# Patient Record
Sex: MALE | Race: WHITE | NOT HISPANIC OR LATINO
[De-identification: names, ages, dates, MRNs, and addresses within clinical notes are randomized per-mention and may not be internally consistent; named-entity substitution may affect disease eponyms.]

---

## 2017-11-17 ENCOUNTER — APPOINTMENT (OUTPATIENT)
Dept: HUMAN REPRODUCTION | Facility: CLINIC | Age: 37
End: 2017-11-17
Payer: COMMERCIAL

## 2017-11-17 PROCEDURE — 89322 SEMEN ANAL STRICT CRITERIA: CPT

## 2019-02-14 ENCOUNTER — EMERGENCY (EMERGENCY)
Facility: HOSPITAL | Age: 39
LOS: 1 days | End: 2019-02-14

## 2019-02-14 VITALS
TEMPERATURE: 99 F | DIASTOLIC BLOOD PRESSURE: 75 MMHG | WEIGHT: 190.92 LBS | HEIGHT: 73 IN | RESPIRATION RATE: 18 BRPM | HEART RATE: 78 BPM | OXYGEN SATURATION: 95 % | SYSTOLIC BLOOD PRESSURE: 117 MMHG

## 2019-03-13 ENCOUNTER — OUTPATIENT (OUTPATIENT)
Dept: OUTPATIENT SERVICES | Facility: HOSPITAL | Age: 39
LOS: 1 days | End: 2019-03-13

## 2019-03-13 VITALS
TEMPERATURE: 98 F | HEIGHT: 72 IN | DIASTOLIC BLOOD PRESSURE: 70 MMHG | RESPIRATION RATE: 16 BRPM | HEART RATE: 68 BPM | WEIGHT: 197.98 LBS | SYSTOLIC BLOOD PRESSURE: 100 MMHG

## 2019-03-13 DIAGNOSIS — Z98.52 VASECTOMY STATUS: Chronic | ICD-10-CM

## 2019-03-13 DIAGNOSIS — D21.0 BENIGN NEOPLASM OF CONNECTIVE AND OTHER SOFT TISSUE OF HEAD, FACE AND NECK: ICD-10-CM

## 2019-03-13 DIAGNOSIS — Z98.890 OTHER SPECIFIED POSTPROCEDURAL STATES: Chronic | ICD-10-CM

## 2019-03-13 NOTE — H&P PST ADULT - NSICDXPROBLEM_GEN_ALL_CORE_FT
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   Preop, famotidine and chlorhexidine instructions provided and questions addressed.

## 2019-03-13 NOTE — H&P PST ADULT - HISTORY OF PRESENT ILLNESS
38 yr old male with no significant medical hx presents for preop evaluation with dx of benign neoplasm of connective of soft tissue of neck.  Pt is now scheduled for Excision Posterior Neck Mass on 03/27/19.

## 2019-03-13 NOTE — H&P PST ADULT - NS MD HP PULSE RADIAL
PROBLEM: Left glenohumeral osteoarthritis      SUBJECTIVE: Pt here for repeat US-guided corticosteroid injection for left shoulder osteoarthritis.      OBJECTIVE: Shoulder x-rays reviewed.      ASSESSMENT: Left glenohumeral osteoarthritis      PLAN: US-guided corticosteroid injection Left shoulder      PROCEDURE: The indications, risks, expected benefits were discussed.  Formal consent was obtained. The humeral head, glenoid, hyperechoic triangle indicating the posterior labrum were identified by ultrasound.  Initially, 3 mL ropivicaine  was injected with US guidance along the injection track for anesthesia.  Using sterile technique, a syringe with a 80 mm , 22 gauge needle containing 1 mL Kenalog 40 mg/mL and 3 mL ropivicaine  were injected using an US guided posterior approach into the left glenohumeral joint.  US used to guide needle placement and verify proper needle position.  Images indicating proper needle placement were recorded.  Upon completion of the injection, the wound was dressed with a bandaid. Follow up with me in 4 weeks.   left normal/right normal

## 2019-03-13 NOTE — H&P PST ADULT - RS GEN PE MLT RESP DETAILS PC
no wheezes/no rhonchi/clear to auscultation bilaterally/no rales/respirations non-labored/breath sounds equal

## 2019-03-26 ENCOUNTER — TRANSCRIPTION ENCOUNTER (OUTPATIENT)
Age: 39
End: 2019-03-26

## 2019-03-26 NOTE — ASU DISCHARGE PLAN (ADULT/PEDIATRIC) - CARE PROVIDER_API CALL
Patito Prakash (MD)  ColonRectal Surgery; Surgery  [address], [address]  [place] [address]  Phone: [phone]  Fax: [phone]  Follow Up Time: 2 weeks

## 2019-03-26 NOTE — ASU DISCHARGE PLAN (ADULT/PEDIATRIC) - PAIN MANAGEMENT
Take over the counter pain medication Prescription called to pharmacy/Take over the counter pain medication

## 2019-03-26 NOTE — ASU DISCHARGE PLAN (ADULT/PEDIATRIC) - CALL YOUR DOCTOR IF YOU HAVE ANY OF THE FOLLOWING:
Wound/Surgical Site with redness, or foul smelling discharge or pus/Pain not relieved by Medications/Swelling that gets worse/Fever greater than (need to indicate Fahrenheit or Celsius)/Bleeding that does not stop

## 2019-03-26 NOTE — ASU DISCHARGE PLAN (ADULT/PEDIATRIC) - ASU DC SPECIAL INSTRUCTIONSFT
- Leave steri strips in place  - Ice for swelling  - Advil or Tylenol for pain  - Follow up with Dr. Prakash in the office in 2 weeks - Leave steri strips in place  - Ice for swelling  - Advil or Tylenol for pain, Percocet for breakthrough pain  - Follow up with Dr. Prakash in the office in 2 weeks

## 2019-03-27 ENCOUNTER — OUTPATIENT (OUTPATIENT)
Dept: OUTPATIENT SERVICES | Facility: HOSPITAL | Age: 39
LOS: 1 days | Discharge: HOME | End: 2019-03-27
Payer: COMMERCIAL

## 2019-03-27 ENCOUNTER — RESULT REVIEW (OUTPATIENT)
Age: 39
End: 2019-03-27

## 2019-03-27 VITALS
RESPIRATION RATE: 18 BRPM | SYSTOLIC BLOOD PRESSURE: 127 MMHG | HEART RATE: 58 BPM | TEMPERATURE: 98 F | OXYGEN SATURATION: 98 % | DIASTOLIC BLOOD PRESSURE: 79 MMHG | HEIGHT: 72 IN | WEIGHT: 197.98 LBS

## 2019-03-27 VITALS
RESPIRATION RATE: 14 BRPM | OXYGEN SATURATION: 100 % | HEART RATE: 50 BPM | TEMPERATURE: 97 F | DIASTOLIC BLOOD PRESSURE: 68 MMHG | SYSTOLIC BLOOD PRESSURE: 103 MMHG

## 2019-03-27 DIAGNOSIS — Z98.52 VASECTOMY STATUS: Chronic | ICD-10-CM

## 2019-03-27 DIAGNOSIS — D21.0 BENIGN NEOPLASM OF CONNECTIVE AND OTHER SOFT TISSUE OF HEAD, FACE AND NECK: ICD-10-CM

## 2019-03-27 DIAGNOSIS — Z98.890 OTHER SPECIFIED POSTPROCEDURAL STATES: Chronic | ICD-10-CM

## 2019-03-27 PROCEDURE — 88304 TISSUE EXAM BY PATHOLOGIST: CPT | Mod: 26

## 2019-04-04 LAB — SURGICAL PATHOLOGY STUDY: SIGNIFICANT CHANGE UP

## 2021-11-06 NOTE — H&P PST ADULT - NSICDXPASTMEDICALHX_GEN_ALL_CORE_FT
This patient has been assessed with a concern for Malnutrition and has been determined to have a diagnosis/diagnoses of Severe protein-calorie malnutrition and Underweight (BMI < 19).    This patient is being managed with:   Diet NPO-  Tube Feeding Modality: Nasogastric  Jevity 1.5 Bg  Total Volume for 24 Hours (mL): 720  Continuous  Starting Tube Feed Rate {mL per Hour}: 10  Increase Tube Feed Rate by (mL): 5     Every 4 hours  Until Goal Tube Feed Rate (mL per Hour): 30  Tube Feed Duration (in Hours): 24  Tube Feed Start Time: 17:00  Entered: Nov 2 2021 12:08PM     PAST MEDICAL HISTORY:  Pilonidal cyst

## 2022-10-31 PROBLEM — L05.91 PILONIDAL CYST WITHOUT ABSCESS: Chronic | Status: ACTIVE | Noted: 2019-03-13

## 2022-11-08 ENCOUNTER — OUTPATIENT (OUTPATIENT)
Dept: OUTPATIENT SERVICES | Facility: HOSPITAL | Age: 42
LOS: 1 days | End: 2022-11-08
Payer: COMMERCIAL

## 2022-11-08 VITALS
HEIGHT: 73 IN | DIASTOLIC BLOOD PRESSURE: 85 MMHG | HEART RATE: 60 BPM | SYSTOLIC BLOOD PRESSURE: 128 MMHG | TEMPERATURE: 98 F | RESPIRATION RATE: 16 BRPM | OXYGEN SATURATION: 99 % | WEIGHT: 204.59 LBS

## 2022-11-08 DIAGNOSIS — D21.12 BENIGN NEOPLASM OF CONNECTIVE AND OTHER SOFT TISSUE OF LEFT UPPER LIMB, INCLUDING SHOULDER: ICD-10-CM

## 2022-11-08 DIAGNOSIS — Z01.818 ENCOUNTER FOR OTHER PREPROCEDURAL EXAMINATION: ICD-10-CM

## 2022-11-08 DIAGNOSIS — Z98.52 VASECTOMY STATUS: Chronic | ICD-10-CM

## 2022-11-08 DIAGNOSIS — Z98.890 OTHER SPECIFIED POSTPROCEDURAL STATES: Chronic | ICD-10-CM

## 2022-11-08 PROCEDURE — G0463: CPT

## 2022-11-08 NOTE — H&P PST ADULT - PROBLEM SELECTOR PLAN 1
Excision of LEFT Posterior Mass on 11/17/2022  NPO as per Anesthesia- no meds on AM of surgery  Instructions reviewed and questions addressed  Pt states that it would be time prohibitive to come to [facility] for Covid19 screening and plans to go to a [facility] lab in his area. Screening to be done on 11/14/2022.

## 2022-11-08 NOTE — H&P PST ADULT - NSICDXPASTSURGICALHX_GEN_ALL_CORE_FT
PAST SURGICAL HISTORY:  H/O: vasectomy     S/P surgical removal of pilonidal cyst      PAST SURGICAL HISTORY:  H/O: vasectomy     S/P excision of lipoma right base of neck/upper back area    S/P surgical removal of pilonidal cyst

## 2022-11-08 NOTE — H&P PST ADULT - PATIENT'S PREFERRED PRONOUN
Upon entering abdomen met with dilated small bowel. Reduced internal hernia. Sutured mesenteric defect. Bilateral tap block Him/He

## 2022-11-08 NOTE — H&P PST ADULT - HISTORY OF PRESENT ILLNESS
43yo male patient who states that his wife notice a growth on his back approx 10 days ago. He denies any pain in the area. He saw Dr. Prakash and surgery was recommended. He presents today for PSTs.

## 2022-11-08 NOTE — H&P PST ADULT - NSICDXPASTMEDICALHX_GEN_ALL_CORE_FT
PAST MEDICAL HISTORY:  Attention deficit disorder (ADD) in adult     Pilonidal cyst      PAST MEDICAL HISTORY:  Attention deficit disorder (ADD) in adult     Mass of soft tissue left upper back area    Pilonidal cyst

## 2022-11-14 LAB — SARS-COV-2 N GENE NPH QL NAA+PROBE: NOT DETECTED

## 2022-11-16 ENCOUNTER — TRANSCRIPTION ENCOUNTER (OUTPATIENT)
Age: 42
End: 2022-11-16

## 2022-11-16 NOTE — ASU DISCHARGE PLAN (ADULT/PEDIATRIC) - CALL YOUR DOCTOR IF YOU HAVE ANY OF THE FOLLOWING:
Bleeding that does not stop/Fever greater than (need to indicate Fahrenheit or Celsius)/Nausea and vomiting that does not stop/Inability to tolerate liquids or foods Bleeding that does not stop/Fever greater than (need to indicate Fahrenheit or Celsius)/Wound/Surgical Site with redness, or foul smelling discharge or pus/Nausea and vomiting that does not stop/Inability to tolerate liquids or foods/Increased irritability or sluggishness

## 2022-11-16 NOTE — ASU DISCHARGE PLAN (ADULT/PEDIATRIC) - ASU DC SPECIAL INSTRUCTIONSFT
REST! Apply ice packs to incision sites 20 mins on, 20 mins off every 4 hours for first 24 hours.     You may take plain Tylenol, ibuprofen (Advil, Motrin), or Aleve if you wish. Do not take Ibuprofen or Aleve if you have been given a prescription for ketorolac (Toradol).    If having trouble having a bowel movement:   - Metamucil or Konsyl (fiber supplement, available over the counter), 1 tablespoon in 8 oz. of water or juice twice a day  - Colace (stool softener, available over the counter), 100mg three times a day    Keep white steri strips on until follow up at office. Sometimes they fall off on own and thats okay. Okay to shower in 24 hours.     Please call [facility] office ([phone]) to schedule a follow-up appointment to be seen in 10-14 days. REST! Apply ice packs to incision sites 20 mins on, 20 mins off every 4 hours for first 24 hours.     You may take plain Tylenol, ibuprofen (Advil, Motrin), or Aleve if you wish. Do not take Ibuprofen or Aleve if you have been given a prescription for ketorolac (Toradol).    If having trouble having a bowel movement:   - Metamucil or Konsyl (fiber supplement, available over the counter), 1 tablespoon in 8 oz. of water or juice twice a day  - Colace (stool softener, available over the counter), 100mg three times a day    Remove outer dressing in 2 days. Keep white steri strips on until follow up at office. Sometimes they fall off on own and thats okay. Okay to shower in 24 hours.     Please call [facility] office ([phone]) to schedule a follow-up appointment to be seen in 10-14 days.

## 2022-11-16 NOTE — ASU DISCHARGE PLAN (ADULT/PEDIATRIC) - CARE PROVIDER_API CALL
Patito Prakash (MD)  ColonRectal Surgery; Surgery  [address], [address]  [place] [address]  Phone: [phone]  Fax: [phone]  Follow Up Time:

## 2022-11-17 ENCOUNTER — RESULT REVIEW (OUTPATIENT)
Age: 42
End: 2022-11-17

## 2022-11-17 ENCOUNTER — OUTPATIENT (OUTPATIENT)
Dept: OUTPATIENT SERVICES | Facility: HOSPITAL | Age: 42
LOS: 1 days | End: 2022-11-17
Payer: COMMERCIAL

## 2022-11-17 VITALS — WEIGHT: 198.42 LBS

## 2022-11-17 VITALS
OXYGEN SATURATION: 100 % | DIASTOLIC BLOOD PRESSURE: 68 MMHG | TEMPERATURE: 97 F | SYSTOLIC BLOOD PRESSURE: 124 MMHG | HEART RATE: 70 BPM | RESPIRATION RATE: 16 BRPM

## 2022-11-17 DIAGNOSIS — D21.12 BENIGN NEOPLASM OF CONNECTIVE AND OTHER SOFT TISSUE OF LEFT UPPER LIMB, INCLUDING SHOULDER: ICD-10-CM

## 2022-11-17 DIAGNOSIS — Z98.52 VASECTOMY STATUS: Chronic | ICD-10-CM

## 2022-11-17 DIAGNOSIS — Z98.890 OTHER SPECIFIED POSTPROCEDURAL STATES: Chronic | ICD-10-CM

## 2022-11-17 PROCEDURE — 88304 TISSUE EXAM BY PATHOLOGIST: CPT

## 2022-11-17 PROCEDURE — 21931 EXC BACK LES SC 3 CM/>: CPT

## 2022-11-17 PROCEDURE — 88304 TISSUE EXAM BY PATHOLOGIST: CPT | Mod: 26

## 2022-11-17 RX ORDER — ONDANSETRON 8 MG/1
4 TABLET, FILM COATED ORAL ONCE
Refills: 0 | Status: DISCONTINUED | OUTPATIENT
Start: 2022-11-17 | End: 2022-11-18

## 2022-11-17 RX ORDER — CEFAZOLIN SODIUM 1 G
2000 VIAL (EA) INJECTION ONCE
Refills: 0 | Status: COMPLETED | OUTPATIENT
Start: 2022-11-17 | End: 2022-11-17

## 2022-11-17 RX ORDER — HYDROMORPHONE HYDROCHLORIDE 2 MG/ML
0.25 INJECTION INTRAMUSCULAR; INTRAVENOUS; SUBCUTANEOUS
Refills: 0 | Status: DISCONTINUED | OUTPATIENT
Start: 2022-11-17 | End: 2022-11-18

## 2022-11-17 RX ORDER — KETOROLAC TROMETHAMINE 30 MG/ML
1 SYRINGE (ML) INJECTION
Qty: 15 | Refills: 0
Start: 2022-11-17

## 2022-11-17 RX ORDER — OXYCODONE HYDROCHLORIDE 5 MG/1
1 TABLET ORAL
Qty: 6 | Refills: 0
Start: 2022-11-17

## 2022-11-17 RX ORDER — OXYCODONE HYDROCHLORIDE 5 MG/1
5 TABLET ORAL ONCE
Refills: 0 | Status: DISCONTINUED | OUTPATIENT
Start: 2022-11-17 | End: 2022-11-18

## 2022-11-17 RX ORDER — SODIUM CHLORIDE 9 MG/ML
1000 INJECTION, SOLUTION INTRAVENOUS
Refills: 0 | Status: DISCONTINUED | OUTPATIENT
Start: 2022-11-17 | End: 2022-11-18

## 2022-11-17 RX ORDER — HYDROMORPHONE HYDROCHLORIDE 2 MG/ML
0.5 INJECTION INTRAMUSCULAR; INTRAVENOUS; SUBCUTANEOUS
Refills: 0 | Status: DISCONTINUED | OUTPATIENT
Start: 2022-11-17 | End: 2022-11-18

## 2022-11-17 RX ADMIN — SODIUM CHLORIDE 75 MILLILITER(S): 9 INJECTION, SOLUTION INTRAVENOUS at 12:36

## 2022-11-17 NOTE — ASU PATIENT PROFILE, ADULT - NSICDXPASTMEDICALHX_GEN_ALL_CORE_FT
PAST MEDICAL HISTORY:  Attention deficit disorder (ADD) in adult     Mass of soft tissue left upper back area    Pilonidal cyst

## 2022-11-17 NOTE — ASU PATIENT PROFILE, ADULT - NSICDXPASTSURGICALHX_GEN_ALL_CORE_FT
PAST SURGICAL HISTORY:  H/O: vasectomy     S/P excision of lipoma right base of neck/upper back area    S/P surgical removal of pilonidal cyst

## 2023-06-13 NOTE — H&P PST ADULT - TEACHING/LEARNING RELIGIOUS CONSIDERATIONS
Tazorac Counseling:  Patient advised that medication is irritating and drying.  Patient may need to apply sparingly and wash off after an hour before eventually leaving it on overnight.  The patient verbalized understanding of the proper use and possible adverse effects of tazorac.  All of the patient's questions and concerns were addressed. none

## 2023-11-06 ENCOUNTER — TRANSCRIPTION ENCOUNTER (OUTPATIENT)
Age: 43
End: 2023-11-06

## 2024-05-28 ENCOUNTER — EMERGENCY (EMERGENCY)
Facility: HOSPITAL | Age: 44
LOS: 1 days | Discharge: HOME | End: 2024-05-28
Attending: STUDENT IN AN ORGANIZED HEALTH CARE EDUCATION/TRAINING PROGRAM
Payer: COMMERCIAL

## 2024-05-28 VITALS
OXYGEN SATURATION: 99 % | HEART RATE: 60 BPM | TEMPERATURE: 98 F | SYSTOLIC BLOOD PRESSURE: 120 MMHG | RESPIRATION RATE: 18 BRPM | DIASTOLIC BLOOD PRESSURE: 81 MMHG

## 2024-05-28 VITALS
SYSTOLIC BLOOD PRESSURE: 137 MMHG | OXYGEN SATURATION: 98 % | WEIGHT: 195.11 LBS | RESPIRATION RATE: 16 BRPM | DIASTOLIC BLOOD PRESSURE: 89 MMHG | HEIGHT: 73 IN | TEMPERATURE: 98 F | HEART RATE: 61 BPM

## 2024-05-28 DIAGNOSIS — Z98.52 VASECTOMY STATUS: Chronic | ICD-10-CM

## 2024-05-28 DIAGNOSIS — Z98.890 OTHER SPECIFIED POSTPROCEDURAL STATES: Chronic | ICD-10-CM

## 2024-05-28 LAB
ALBUMIN SERPL ELPH-MCNC: 4.8 G/DL — SIGNIFICANT CHANGE UP (ref 3.3–5)
ALP SERPL-CCNC: 67 U/L — SIGNIFICANT CHANGE UP (ref 40–120)
ALT FLD-CCNC: 29 U/L — SIGNIFICANT CHANGE UP (ref 10–45)
ANION GAP SERPL CALC-SCNC: 11 MMOL/L — SIGNIFICANT CHANGE UP (ref 5–17)
AST SERPL-CCNC: 19 U/L — SIGNIFICANT CHANGE UP (ref 10–40)
BASOPHILS # BLD AUTO: 0.04 K/UL — SIGNIFICANT CHANGE UP (ref 0–0.2)
BASOPHILS NFR BLD AUTO: 0.6 % — SIGNIFICANT CHANGE UP (ref 0–2)
BILIRUB SERPL-MCNC: 0.7 MG/DL — SIGNIFICANT CHANGE UP (ref 0.2–1.2)
BUN SERPL-MCNC: 16 MG/DL — SIGNIFICANT CHANGE UP (ref 7–23)
CALCIUM SERPL-MCNC: 9.9 MG/DL — SIGNIFICANT CHANGE UP (ref 8.4–10.5)
CHLORIDE SERPL-SCNC: 102 MMOL/L — SIGNIFICANT CHANGE UP (ref 96–108)
CO2 SERPL-SCNC: 25 MMOL/L — SIGNIFICANT CHANGE UP (ref 22–31)
CREAT SERPL-MCNC: 0.99 MG/DL — SIGNIFICANT CHANGE UP (ref 0.5–1.3)
EGFR: 98 ML/MIN/1.73M2 — SIGNIFICANT CHANGE UP
EOSINOPHIL # BLD AUTO: 0.27 K/UL — SIGNIFICANT CHANGE UP (ref 0–0.5)
EOSINOPHIL NFR BLD AUTO: 3.8 % — SIGNIFICANT CHANGE UP (ref 0–6)
GLUCOSE SERPL-MCNC: 85 MG/DL — SIGNIFICANT CHANGE UP (ref 70–99)
HCT VFR BLD CALC: 48.4 % — SIGNIFICANT CHANGE UP (ref 39–50)
HGB BLD-MCNC: 15.7 G/DL — SIGNIFICANT CHANGE UP (ref 13–17)
IMM GRANULOCYTES NFR BLD AUTO: 0.4 % — SIGNIFICANT CHANGE UP (ref 0–0.9)
LYMPHOCYTES # BLD AUTO: 1.84 K/UL — SIGNIFICANT CHANGE UP (ref 1–3.3)
LYMPHOCYTES # BLD AUTO: 26.1 % — SIGNIFICANT CHANGE UP (ref 13–44)
MCHC RBC-ENTMCNC: 28.9 PG — SIGNIFICANT CHANGE UP (ref 27–34)
MCHC RBC-ENTMCNC: 32.4 GM/DL — SIGNIFICANT CHANGE UP (ref 32–36)
MCV RBC AUTO: 89.1 FL — SIGNIFICANT CHANGE UP (ref 80–100)
MONOCYTES # BLD AUTO: 0.48 K/UL — SIGNIFICANT CHANGE UP (ref 0–0.9)
MONOCYTES NFR BLD AUTO: 6.8 % — SIGNIFICANT CHANGE UP (ref 2–14)
NEUTROPHILS # BLD AUTO: 4.38 K/UL — SIGNIFICANT CHANGE UP (ref 1.8–7.4)
NEUTROPHILS NFR BLD AUTO: 62.3 % — SIGNIFICANT CHANGE UP (ref 43–77)
NRBC # BLD: 0 /100 WBCS — SIGNIFICANT CHANGE UP (ref 0–0)
PLATELET # BLD AUTO: 264 K/UL — SIGNIFICANT CHANGE UP (ref 150–400)
POTASSIUM SERPL-MCNC: 4 MMOL/L — SIGNIFICANT CHANGE UP (ref 3.5–5.3)
POTASSIUM SERPL-SCNC: 4 MMOL/L — SIGNIFICANT CHANGE UP (ref 3.5–5.3)
PROT SERPL-MCNC: 7.8 G/DL — SIGNIFICANT CHANGE UP (ref 6–8.3)
RBC # BLD: 5.43 M/UL — SIGNIFICANT CHANGE UP (ref 4.2–5.8)
RBC # FLD: 12.8 % — SIGNIFICANT CHANGE UP (ref 10.3–14.5)
SODIUM SERPL-SCNC: 138 MMOL/L — SIGNIFICANT CHANGE UP (ref 135–145)
WBC # BLD: 7.04 K/UL — SIGNIFICANT CHANGE UP (ref 3.8–10.5)
WBC # FLD AUTO: 7.04 K/UL — SIGNIFICANT CHANGE UP (ref 3.8–10.5)

## 2024-05-28 PROCEDURE — 80053 COMPREHEN METABOLIC PANEL: CPT

## 2024-05-28 PROCEDURE — 99284 EMERGENCY DEPT VISIT MOD MDM: CPT

## 2024-05-28 PROCEDURE — 85025 COMPLETE CBC W/AUTO DIFF WBC: CPT

## 2024-05-28 PROCEDURE — 86140 C-REACTIVE PROTEIN: CPT

## 2024-05-28 PROCEDURE — 99285 EMERGENCY DEPT VISIT HI MDM: CPT

## 2024-05-28 PROCEDURE — 36415 COLL VENOUS BLD VENIPUNCTURE: CPT

## 2024-05-28 RX ORDER — FAMOTIDINE 10 MG/ML
20 INJECTION INTRAVENOUS DAILY
Refills: 0 | Status: DISCONTINUED | OUTPATIENT
Start: 2024-05-28 | End: 2024-05-31

## 2024-05-28 RX ORDER — SODIUM CHLORIDE 9 MG/ML
1000 INJECTION INTRAMUSCULAR; INTRAVENOUS; SUBCUTANEOUS ONCE
Refills: 0 | Status: COMPLETED | OUTPATIENT
Start: 2024-05-28 | End: 2024-05-28

## 2024-05-28 RX ORDER — DEXAMETHASONE 0.5 MG/5ML
10 ELIXIR ORAL ONCE
Refills: 0 | Status: COMPLETED | OUTPATIENT
Start: 2024-05-28 | End: 2024-05-28

## 2024-05-28 RX ORDER — DIPHENHYDRAMINE HCL 50 MG
25 CAPSULE ORAL ONCE
Refills: 0 | Status: COMPLETED | OUTPATIENT
Start: 2024-05-28 | End: 2024-05-28

## 2024-05-28 RX ORDER — ACETAMINOPHEN 500 MG
650 TABLET ORAL ONCE
Refills: 0 | Status: COMPLETED | OUTPATIENT
Start: 2024-05-28 | End: 2024-05-28

## 2024-05-28 RX ADMIN — Medication 30 MILLILITER(S): at 12:42

## 2024-05-28 RX ADMIN — Medication 10 MILLIGRAM(S): at 13:51

## 2024-05-28 RX ADMIN — Medication 650 MILLIGRAM(S): at 12:42

## 2024-05-28 RX ADMIN — SODIUM CHLORIDE 1000 MILLILITER(S): 9 INJECTION INTRAMUSCULAR; INTRAVENOUS; SUBCUTANEOUS at 12:42

## 2024-05-28 RX ADMIN — Medication 25 MILLIGRAM(S): at 11:06

## 2024-05-28 RX ADMIN — FAMOTIDINE 20 MILLIGRAM(S): 10 INJECTION INTRAVENOUS at 11:06

## 2024-05-28 NOTE — ED ADULT NURSE NOTE - OBJECTIVE STATEMENT
43y M A&O x3, ambulatory, wife at bedside. Presenting to the ER with swollen left eye which [place]. Pt unsure if he came in contact with something to cause this reaction; pt has had poison ivy in the past and states "this is nothing like it". On Saturday pt had diarrhea and stomach pain which he took Tums wit some relief, abd pain has continued, pt eating but with decreased p.o intake.  On assessment pt left eye and ear has swelling, with redness and some redness to left of neck. This morning woke up with redness and swelling to shaft of penis. 43y M A&O x3, ambulatory, wife at bedside. Presenting to the ER with swollen left eye which [place]. Pt unsure if he came in contact with something to cause this reaction; pt has had poison ivy in the past and states "this is nothing like it". On Saturday pt had diarrhea and stomach pain which he took Tums wit some relief, abd pain has continued, pt eating but with decreased p.o intake.  On assessment pt left eye and ear has swelling, with redness and some redness to left of neck. This morning woke up with redness and swelling to shaft of penis. "My eye feels tight with pressure"

## 2024-05-28 NOTE — ED ADULT NURSE NOTE - NS TRANSFER EYEGLASSES PAIRS
Pt. presents to  w/ c/o weakness. Pt. had PPM placed last week and c/o CP following procedure. 1 pair

## 2024-05-28 NOTE — ED PROVIDER NOTE - OBJECTIVE STATEMENT
42 yo M w/ PMHx of subtendinous inflammatory bowel syndrome, lipoma, and pilonidal cyst presents for 4 days of abdominal pain with diarrhea, thigh swelling, blurry vision, and penile swelling after walking in the woods in [facility] (Saturday 5/20 5 AM).  Saturday, after returning from the woods, patient had abdominal pain and sudden episode of diarrhea during which she said he may have brushed his left eye against a leaf.  His abdominal pain was severe on Saturday and has since slight decrease, but patient continues to have profuse diarrhea.  Since Saturday, patient started having worsening swelling around his left eye and now also has paresthesias around the left mid face.  He also endorses burning sensation around penis. He denies any fever/chills/cough/sore, nauseousness, CP, SOB, /GI symptoms or recent trauma.  He has previously had poison ivy several times, but this is distinctly different.  He denies any other rashes.

## 2024-05-28 NOTE — ED PROVIDER NOTE - PROGRESS NOTE DETAILS
Yusra PGY2: 44 yo M w/ PMHx of subtendinous inflammatory bowel syndrome, lipoma, and pilonidal cyst presents for 4 days of abdominal pain with diarrhea, thigh swelling, blurry vision, and penile swelling after walking in the woods in [facility] (Saturday 5/20 5 AM).  Vital signs unremarkable.  Physical exam is remarkable for  periorbital eye swelling (infraorbital > supraorbital), OS 20/63, pressure 16.2-16.8, slight purulent substance,  L upper lip lesion (0.5cm), L V2 distribution redness/TTP, erythema proximal to penile head w/ no testicular TTP/pain.  Concern for contact dermatitis versus early zoster possibly superimposed on gastroenteritis.  Plan for labs, Benadryl/famotidine, GI cocktail, and ophthalmology consult. Yusra PGY2: Ophthalmology consulted for concern for shingles with V2 distribution involving eyes.  No vesicles seen on face or ear. Yusra PGY2: Ophthalmology evaluated patient and states that eye exam is benign.  Concern for allergic reaction.  Patient has received diphenhydramine, famotidine, dexamethasone 10 mg, Maalox, Tylenol, and fluids with minimal improvement in symptoms.  Counseled that patient most likely has allergic reaction/contact dermatitis and that they should clean or get rid of any materials that may have been exposed to allergen.  Discussed with patient to continue to take Benadryl as needed for allergic symptoms and should expect swelling to improve over the next 24 hours.  Discussed with patient return to the ED if swelling worsens over the next 24 hours and if he has any airway symptoms.  Patient and wife agree to plan.  Low concern for herpes zoster.

## 2024-05-28 NOTE — ED ADULT TRIAGE NOTE - CHIEF COMPLAINT QUOTE
L eye swollen since saturday, progressively getting worse  also had severe abd pain followed by diarrhea yesterday L eye swollen since saturday, progressively getting worse  also had episode of severe abd pain followed by diarrhea

## 2024-05-28 NOTE — ED ADULT NURSE NOTE - NSFALLUNIVINTERV_ED_ALL_ED
Bed/Stretcher in lowest position, wheels locked, appropriate side rails in place/Call bell, personal items and telephone in reach/Instruct patient to call for assistance before getting out of bed/chair/stretcher/Non-slip footwear applied when patient is off stretcher/[place] to call system/Physically safe environment - no spills, clutter or unnecessary equipment/Purposeful proactive rounding/Room/bathroom lighting operational, light cord in reach

## 2024-05-28 NOTE — ED ADULT NURSE NOTE - TEMPLATE LIST FOR HEAD TO TOE ASSESSMENT
This 67-year-old man had a slip with a ankle twist 2 days ago.  He developed ecchymosis medially and a small amount laterally.  He has pain over the medial and lateral aspect of his ankle.  He has a complex ankle history with multiple surgeries for ligament reconstruction after injuries.  He does not drive because a history of a brain injury.  He works as an .  I reviewed his patient survey information in the EMR.    On examination he is alert oriented and in no acute distress.  The right foot and ankle are swollen with ecchymosis medially and a small amount laterally.  He has numerous well-healed incisions without any erythema or open wounds.  Eyes were not nonicteric.  Respirations were regular and unlabored.  Sensations intact in the foot and is well perfused.    I reviewed x-rays which show 2 small pieces of bone that seem to have come off of the medial malleolus right where the patient's ecchymosis is.  It is likely that he avulsed part of his deltoid ligament.  He has some lateral changes which seem old.  There is no sign of other fractures.    Seems to be mostly a soft tissue injury.  I will allow the patient to weight-bear as tolerated in the boot and have him return in 1 month.  He should keep this elevated and wrapped and minimize the amount of time upright or walking.  I have answered all his questions.       Abdominal Pain, N/V/D

## 2024-05-28 NOTE — ED PROVIDER NOTE - CLINICAL SUMMARY MEDICAL DECISION MAKING FREE TEXT BOX
43-year-old male with no concerning past medical history presenting to ER for periorbital edema surrounding left eye, with mild associated tingling and intermittent itching.  Also reports mild blurred vision left eye, and tingling/mild pain extending out to his left TMJ.  Also reports small oral mucosal lesions anteriorly at the upper and lower lips.  Also describes rash to ventral aspect of distally.  He reports tingling and intermittent itching in all of these regions.  He denies double vision.  Patient recently had a cabin in [facility], no known bug bites or tick bites.  May have brushed Aleve along his face but states this is not similar to prior episodes of poison ivy had.  He does report abdominal pain 2 days ago which is now improving and associated nausea and bloody diarrhea which is still ongoing.  He denies vomiting.  No history abdominal surgeries.  Overall patient well-appearing and ambulatory but appears irritated by his symptoms in his left eye.  Exam consistent with findings reported above in addition to minimal epigastric tenderness to palpation and slight decrease in visual acuity in left eye secondary to reported blurred vision.  Extraocular movements intact without pain.  Patient sexually active with single partner.  No penile discharge.  No fevers or chills.  No joint pains.  Consider shingles, contact dermatitis, poison ivy, other allergic reaction.  Consider reactive inflammatory syndrome secondary to primary GI illness.  Less likely rheumatologic etiology.  Will obtain screening CRP.  Will discuss with ophthalmology for eye examination given decreased visual acuity.  Will check screening lab work.  Given patient overall well-appearing, if lab work reassuring and ophthalmology clears patient for ongoing outpatient follow-up, likely plan for discharge with outpatient treatment/management.  Will reassess.

## 2024-05-28 NOTE — ED PROVIDER NOTE - NSFOLLOWUPCLINICS_GEN_ALL_ED_FT
[facility] - Ophthalmology  Ophthalmology  [address]  [place] [address]  Phone: [phone]  Fax:   Follow Up Time: 4-6 Days

## 2024-05-28 NOTE — ED PROVIDER NOTE - PATIENT PORTAL LINK FT
You can access the FollowMyHealth Patient Portal offered by [facility] by registering at the following website: [email or web address]. By joining [employer]’s FollowMyHealth portal, you will also be able to view your health information using other applications (apps) compatible with our system.

## 2024-05-28 NOTE — ED PROVIDER NOTE - ATTENDING CONTRIBUTION TO CARE
I, Damon Betts, have performed a history and physical exam on this patient, and discussed their management with the resident. I have fully participated in the care of this patient. I agree with the history, physical exam, and plan as documented by the resident

## 2024-05-28 NOTE — CONSULT NOTE ADULT - SUBJECTIVE AND OBJECTIVE BOX
[facility] DEPARTMENT OF OPHTHALMOLOGY - INITIAL ADULT CONSULT  -----------------------------------------------------------------------------------------------------------------  Navid Florez MD  PGY 3  Contact on Teams  -----------------------------------------------------------------------------------------------------------------    HPI:  42 yo M w/ PMHx of subtendinous inflammatory bowel syndrome, lipoma, and pilonidal cyst presents for 4 days of abdominal pain with diarrhea, thigh swelling, blurry vision, and penile swelling after walking in the woods in [facility] (Saturday 5/20 5 AM).  Saturday, after returning from the woods, patient had abdominal pain and sudden episode of diarrhea during which she said he may have brushed his left eye against a leaf.  His abdominal pain was severe on Saturday and has since slight decrease, but patient continues to have profuse diarrhea.  Since Saturday, patient started having worsening swelling around his left eye and now also has paresthesias around the left mid face.  He also endorses burning sensation around penis. He denies any fever/chills/cough/sore, nauseousness, CP, SOB, /GI symptoms or recent trauma.  He has previously had poison ivy several times, but this is distinctly different.  He denies any other rashes.    Interval History:   [place] Developed abdominal pain after ingesting alcohol beverage after which had one episode of diarrhea in the woods. Endorsed on Saturday, developed worsening left eyelid swelling that his since led to shutting of his eye.      PAST MEDICAL & SURGICAL HISTORY:  Pilonidal cyst      Attention deficit disorder (ADD) in adult      Mass of soft tissue  left upper back area      H/O: vasectomy      S/P surgical removal of pilonidal cyst      S/P excision of lipoma  right base of neck/upper back area        Past Ocular History: ***  Ophthalmic Medications: ***  FAMILY HISTORY:    Social History: ***    MEDICATIONS  (STANDING):  famotidine    Tablet 20 milliGRAM(s) Oral daily    MEDICATIONS  (PRN):    Allergies & Intolerances:   No Known Allergies    Review of Systems:  Constitutional: No fever, chills  Eyes: No blurry vision, flashes, floaters, FBS, erythema, discharge, double vision, OU  Neuro: No tremors  Cardiovascular: No chest pain, palpitations  Respiratory: No SOB, no cough  GI: No nausea, vomiting, abdominal pain  : No dysuria  Skin: no rash  Psych: no depression  Endocrine: no polyuria, polydipsia  Heme/lymph: no swelling    VITALS: T(C): 36.7 (05-28-24 @ 12:06)  T(F): 98.1 (05-28-24 @ 12:06), Max: 98.1 (05-28-24 @ 12:06)  HR: 55 (05-28-24 @ 12:06) (55 - 61)  BP: 130/86 (05-28-24 @ 12:06) (122/60 - 137/89)  RR:  (16 - 17)  SpO2:  (97% - 98%)  Wt(kg): --  General: AAO x 3, appropriate mood and affect    Ophthalmology Exam:  Visual acuity (sc): 20/20 OD 20/20 OS  Pupils: PERRL OU, no APD  Ttono: 8 OD, 13 OS  Extraocular movements (EOMs): Full OU, no pain, no diplopia  Confrontational Visual Field (CVF): Full OU  Color Plates: 12/12 OD 12/12 OS    Pen Light Exam (PLE)  External: Flat OU  Lids/Lashes/Lacrimal Ducts: Flat OU    Sclera/Conjunctiva: W+Q OU  Cornea: Cl OU  Anterior Chamber: D+F OU    Iris: Flat OU  Lens: Cl OU    Fundus Exam: dilated with 1% tropicamide and 2.5% phenylephrine  Approval obtained from primary team for dilation  Patient aware that pupils can remained dilated for at least 4-6 hours  Exam performed with 20D lens    Vitreous: wnl OU  Disc, cup/disc: ***  Macula: ***  Vessels: wnl OU  Periphery: wnl OU    Labs/Imaging:  ***   [facility] DEPARTMENT OF OPHTHALMOLOGY - INITIAL ADULT CONSULT  -----------------------------------------------------------------------------------------------------------------  Chauncey Bell  PGY 1  Contact on Teams  -----------------------------------------------------------------------------------------------------------------    HPI:  44 yo M w/ PMHx of subtendinous inflammatory bowel syndrome, lipoma, and pilonidal cyst presents for 4 days of abdominal pain with diarrhea, thigh swelling, blurry vision, and penile swelling after walking in the woods in [facility] (Saturday 5/20 5 AM).  Saturday, after returning from the woods, patient had abdominal pain and sudden episode of diarrhea during which she said he may have brushed his left eye against a leaf.  His abdominal pain was severe on Saturday and has since slight decrease, but patient continues to have profuse diarrhea.  Since Saturday, patient started having worsening swelling around his left eye and now also has paresthesias around the left mid face.  He also endorses burning sensation around penis. He denies any fever/chills/cough/sore, nauseousness, CP, SOB, /GI symptoms or recent trauma.  He has previously had poison ivy several times, but this is distinctly different.  He denies any other rashes.    Interval History:   [place] during which on Saturday he developed severe abdominal pain and diarrhea. During diarrhea episode patient endorsed that he brushed up against a leaf after which he developed worsening progressive left eye swelling, watery discharge, and itchiness. Has had prior episodes of poison ivy however never to this extent. Of note patient also developed swelling and itchiness in his penis area. Today patients swelling progressed to the point that he could not open his eyes. Continues to have significant itchiness and new rash that has developed without any vesicles or superficial tenderness to palpation. Denies any vision loss, double vision, flashes, floaters, bites, trauma, FBS, mucus discharge, parasthesia, fevers, or chills.     PAST MEDICAL & SURGICAL HISTORY:  Pilonidal cyst      Attention deficit disorder (ADD) in adult      Mass of soft tissue  left upper back area      H/O: vasectomy      S/P surgical removal of pilonidal cyst      S/P excision of lipoma  right base of neck/upper back area        Past Ocular History: None  Ophthalmic Medications: None  FAMILY HISTORY:    Social History: coComment    MEDICATIONS  (STANDING):  famotidine    Tablet 20 milliGRAM(s) Oral daily    MEDICATIONS  (PRN):    Allergies & Intolerances:   No Known Allergies    Review of Systems:  Constitutional: No fever, chills  Eyes: No blurry vision, flashes, floaters, FBS, erythema, double vision, OU  Neuro: No tremors  Cardiovascular: No chest pain, palpitations  Respiratory: No SOB, no cough  GI: No nausea, vomiting, abdominal pain  : No dysuria  Skin: no rash  Psych: no depression  Endocrine: no polyuria, polydipsia  Heme/lymph: no swelling    VITALS: T(C): 36.7 (05-28-24 @ 12:06)  T(F): 98.1 (05-28-24 @ 12:06), Max: 98.1 (05-28-24 @ 12:06)  HR: 55 (05-28-24 @ 12:06) (55 - 61)  BP: 130/86 (05-28-24 @ 12:06) (122/60 - 137/89)  RR:  (16 - 17)  SpO2:  (97% - 98%)  Wt(kg): --  General: AAO x 3, appropriate mood and affect    Ophthalmology Exam:  Visual acuity (sc): 20/20 OD 20/20 OS  Pupils: PERRL OU, no APD  Ttono: 8 OD, 13 OS  Extraocular movements (EOMs): Full OU, no pain, no diplopia  Confrontational Visual Field (CVF): Full OU    Pen Light Exam (PLE)  External: External edema, irritation and erythema, one small pimple noted  Lids/Lashes/Lacrimal Ducts: Periorbital edema w/ lid closure  Sclera/Conjunctiva: W+Q OU  Cornea: Cl OU  Anterior Chamber: D+F OU    Iris: Flat OU  Lens: Cl OU    Fundus Exam: dilated with 1% tropicamide and 2.5% phenylephrine  Approval obtained from primary team for dilation  Patient aware that pupils can remained dilated for at least 4-6 hours  Exam performed with 20D lens    Vitreous: wnl OU  Disc, cup/disc: 0.3 OU  Macula: Flat OU  Vessels: wnl OU  Periphery: wnl OU    Labs/Imaging:  CRP <3   [facility] DEPARTMENT OF OPHTHALMOLOGY - INITIAL ADULT CONSULT  -----------------------------------------------------------------------------------------------------------------  Chauncey Bell  PGY 1  Contact on Teams  -----------------------------------------------------------------------------------------------------------------    HPI:  44 yo M w/ PMHx of subtendinous inflammatory bowel syndrome, lipoma, and pilonidal cyst presents for 4 days of abdominal pain with diarrhea, thigh swelling, blurry vision, and penile swelling after walking in the woods in [facility] (Saturday 5/20 5 AM).  Saturday, after returning from the woods, patient had abdominal pain and sudden episode of diarrhea during which she said he may have brushed his left eye against a leaf.  His abdominal pain was severe on Saturday and has since slight decrease, but patient continues to have profuse diarrhea.  Since Saturday, patient started having worsening swelling around his left eye and now also has paresthesias around the left mid face.  He also endorses burning sensation around penis. He denies any fever/chills/cough/sore, nauseousness, CP, SOB, /GI symptoms or recent trauma.  He has previously had poison ivy several times, but this is distinctly different.  He denies any other rashes.    Interval History:   [place] during which on Saturday he developed severe abdominal pain and diarrhea. During diarrhea episode patient endorsed that he brushed up against a leaf after which he developed worsening progressive left eye swelling, watery discharge, and itchiness. Has had prior episodes of poison ivy however never to this extent. Of note patient also developed swelling and itchiness in his penis area. Today patients swelling progressed to the point that he could not open his eyes. Continues to have significant itchiness and new rash that has developed without any vesicles or superficial tenderness to palpation. Denies any vision loss, double vision, flashes, floaters, bites, trauma, FBS, mucus discharge, parasthesia, fevers, or chills.     PAST MEDICAL & SURGICAL HISTORY:  Pilonidal cyst      Attention deficit disorder (ADD) in adult      Mass of soft tissue  left upper back area      H/O: vasectomy      S/P surgical removal of pilonidal cyst      S/P excision of lipoma  right base of neck/upper back area        Past Ocular History: None  Ophthalmic Medications: None  FAMILY HISTORY:    Social History: [employer]    MEDICATIONS  (STANDING):  famotidine    Tablet 20 milliGRAM(s) Oral daily    MEDICATIONS  (PRN):    Allergies & Intolerances:   No Known Allergies    Review of Systems:  Constitutional: No fever, chills  Eyes: No blurry vision, flashes, floaters, FBS, erythema, double vision, OU  Neuro: No tremors  Cardiovascular: No chest pain, palpitations  Respiratory: No SOB, no cough  GI: No nausea, vomiting, abdominal pain  : No dysuria  Skin: no rash  Psych: no depression  Endocrine: no polyuria, polydipsia  Heme/lymph: no swelling    VITALS: T(C): 36.7 (05-28-24 @ 12:06)  T(F): 98.1 (05-28-24 @ 12:06), Max: 98.1 (05-28-24 @ 12:06)  HR: 55 (05-28-24 @ 12:06) (55 - 61)  BP: 130/86 (05-28-24 @ 12:06) (122/60 - 137/89)  RR:  (16 - 17)  SpO2:  (97% - 98%)  Wt(kg): --  General: AAO x 3, appropriate mood and affect    Ophthalmology Exam:  Visual acuity (sc): 20/20 OD 20/20 OS  Pupils: PERRL OU, no APD  Ttono: 8 OD, 13 OS  Extraocular movements (EOMs): Full OU, no pain, no diplopia  Confrontational Visual Field (CVF): Full OU    Pen Light Exam (PLE)  External: External edema, irritation and erythema, no eyelid tightening   Lids/Lashes/Lacrimal Ducts: Periorbital edema w/ lid closure  Sclera/Conjunctiva: W+Q OU  Cornea: Cl OU  Anterior Chamber: D+F OU    Iris: Flat OU  Lens: Cl OU    Fundus Exam: dilated with 1% tropicamide and 2.5% phenylephrine  Approval obtained from primary team for dilation  Patient aware that pupils can remained dilated for at least 4-6 hours  Exam performed with 20D lens    Vitreous: wnl OU  Disc, cup/disc: 0.3 OU  Macula: Flat OU  Vessels: wnl OU  Periphery: wnl OU    Labs/Imaging:  CRP <3

## 2024-05-28 NOTE — ED PROVIDER NOTE - PHYSICAL EXAMINATION
GENERAL: no acute distress, mesomorphic body habitus  HEENT: L upper lip lesion (0.5cm), L V2 distribution redness/TTP, no findings in tympanic membrane, atraumatic, hearing grossly intact, no nasal discharge or epistaxis, clear pharynx  L EYE: L periorbital eye swelling (infraorbital > supraorbital), OS 20/63, pressure 16.2-16.8, slight purulent substance, EOMI w/ no pain, no conjunctivitis  R EYE: normal appearance, 20/20, pressure 16.2  CV: regular rate, normal rhythm, normal S1/S2, no murmurs/rubs, no cyanosis  PULM: normal work of breathing, normal O2 saturation on -, clear breath sounds in b/l upper/lower lung fields, no crackles/rales/rhonchi/wheezing  GI: soft/non-tender/nondistended abdomen, no guarding or rebound tenderness, no palpable masses  : erythema proximal to penile head w/ no testicular TTP/pain - Chaperoned by Aníbal (PA student)  NEURO: A&Ox4, follows commands, normal speech, no focal motor or sensory deficits  MSK: no joint tenderness/swelling/erythema, ranging all extremities with no appreciable loss of ROM  EXT: no peripheral edema, no calf tenderness, no redness or swelling  SKIN: warm, dry, and intact, no rashes  PSYCH: appropriate mood and affect

## 2024-05-28 NOTE — ED ADULT NURSE NOTE - CHIEF COMPLAINT QUOTE
L eye swollen since saturday, progressively getting worse  also had episode of severe abd pain followed by diarrhea

## 2024-05-28 NOTE — CONSULT NOTE ADULT - ASSESSMENT
Assessment and Recommendations:    HPI:  44 yo M w/ PMHx of subtendinous inflammatory bowel syndrome, lipoma, and pilonidal cyst presents for 4 days of abdominal pain with diarrhea, thigh swelling, blurry vision, and penile swelling after walking in the woods in [facility] (Saturday 5/20 5 AM).  Saturday, after returning from the woods, patient had abdominal pain and sudden episode of diarrhea during which she said he may have brushed his left eye against a leaf.  His abdominal pain was severe on Saturday and has since slight decrease, but patient continues to have profuse diarrhea.  Since Saturday, patient started having worsening swelling around his left eye and now also has paresthesias around the left mid face.  He also endorses burning sensation around penis. He denies any fever/chills/cough/sore, nauseousness, CP, SOB, /GI symptoms or recent trauma.  He has previously had poison ivy several times, but this is distinctly different.  He denies any other rashes    43y male with no past medical ocular history presented to the ED with worsening periorbital edema OS with itchiness, clear discharge, and eyelid irritation ophthalmology consulted to rule out VZV vs pre-septal cellulitis vs allergic reaction    #Left Eye Periorbital edema   - VA 20/20 OU, no rAPD, IOP wnl, EOMs full  - Pt with recent outdoors exposure with contact with leaf with prior poison ivy dermatitis   - Anterior exam with periorbital edema, irritation, erythema, and clear discharge without signs of intraocular involvement  - DFE unremarkable  - Differential includes allergic dermatitis with periorbital edema vs less likely VZV/HSV infection as presentation   - Recommend IV steroids for management of periorbital edema given poor response to IV benadryl  - Recommend Ice pack for symptomatic management  - Would consider sending VZV/HSV PCR culture  - If patient worsens may consider starting antiviral therapy  -     Seen and discussed with ***.    Outpatient Follow-up: Patient should follow-up with his/her ophthalmologist or with [facility] Department of Ophthalmology within 1 week of after discharge at:    [address]  [place] [address]  [phone]    Navid Florez MD, PGY-3  Also available on [employer] Teams     Assessment and Recommendations:    HPI:  42 yo M w/ PMHx of subtendinous inflammatory bowel syndrome, lipoma, and pilonidal cyst presents for 4 days of abdominal pain with diarrhea, thigh swelling, blurry vision, and penile swelling after walking in the woods in [facility] (Saturday 5/20 5 AM).  Saturday, after returning from the woods, patient had abdominal pain and sudden episode of diarrhea during which she said he may have brushed his left eye against a leaf.  His abdominal pain was severe on Saturday and has since slight decrease, but patient continues to have profuse diarrhea.  Since Saturday, patient started having worsening swelling around his left eye and now also has paresthesias around the left mid face.  He also endorses burning sensation around penis. He denies any fever/chills/cough/sore, nauseousness, CP, SOB, /GI symptoms or recent trauma.  He has previously had poison ivy several times, but this is distinctly different.  He denies any other rashes    43y male with no past medical ocular history presented to the ED with worsening periorbital edema OS with itchiness, clear discharge, and eyelid irritation ophthalmology consulted to rule out VZV vs pre-septal cellulitis vs allergic periorbital reaction    #Left Eye Periorbital edema   - VA 20/20 OU, no rAPD, IOP wnl, EOMs full  - Pt with recent outdoors exposure with contact with leaf with prior poison ivy dermatitis   - Anterior exam with periorbital edema, irritation, erythema, and clear discharge without signs of intraocular involvement  - DFE unremarkable  - Patient afebrile, no WBC count, CRP wnl low concern for pre-septal cellulitis  - Differential includes allergic dermatitis with periorbital edema given itchiness, contact exposure, and periorbital irritations consistent with allergic reaction vs less likely VZV/HSV infection given lack of vesicles noted on exam  - Recommend IV steroids for management of periorbital edema given poor response to IV benadryl  - Recommend Ice pack for symptomatic management  - Would consider sending VZV/HSV PCR culture  - If patient worsens may consider starting antiviral therapy  - can follow up with Ophthalmology outpatient    Seen and discussed with Dr. Mendoza.    Outpatient Follow-up: Patient should follow-up with his/her ophthalmologist or with [facility] Department of Ophthalmology within 1 week of after discharge at:    [address]  [place] [address]  [phone]    Chauncey Bell MD, PGY-1  Also available on [employer] Teams

## 2025-01-01 NOTE — H&P PST ADULT - MALLAMPATI CLASS
Discharged Class I (easy) - visualization of the soft palate, fauces, uvula, and both anterior and posterior pillars

## (undated) DEVICE — DRAPE SPLIT SHEETS 77X120"

## (undated) DEVICE — DRSG STERISTRIPS 0.5X4"

## (undated) DEVICE — LABEL MED BLANK W PEN

## (undated) DEVICE — DOUBLE BASIN SET

## (undated) DEVICE — BLANKET WARMER FULL ADULT

## (undated) DEVICE — PACK MINOR WITH LAP

## (undated) DEVICE — SUT VICRYL 2-0 27" SH UNDYED

## (undated) DEVICE — PREP BETADINE KIT

## (undated) DEVICE — SOL IRR POUR NS 0.9% 500ML

## (undated) DEVICE — SYR LUER LOK 20CC

## (undated) DEVICE — SUT VICRYL 3-0 27" SH UNDYED

## (undated) DEVICE — WRAP COMPRESSION CALF MED

## (undated) DEVICE — ELCTR GROUNDING PAD ADULT COVIDIEN

## (undated) DEVICE — DRAPE TOWEL BLUE 17" X 24"

## (undated) DEVICE — SUT MONOCRYL 4-0 18" P-3 UNDYED

## (undated) DEVICE — ELCTR EDGE BOVIE INSULATED BLADE TIP 2.75"

## (undated) DEVICE — POSITIONER STRAP ARMBOARD VELCRO TS-30 12

## (undated) DEVICE — DRAPE 3/4 SHEET 52X76"